# Patient Record
Sex: FEMALE | Race: WHITE
[De-identification: names, ages, dates, MRNs, and addresses within clinical notes are randomized per-mention and may not be internally consistent; named-entity substitution may affect disease eponyms.]

---

## 2019-03-12 ENCOUNTER — HOSPITAL ENCOUNTER (EMERGENCY)
Dept: HOSPITAL 53 - M ED | Age: 29
LOS: 1 days | Discharge: ANOTHER HOSPITAL | End: 2019-03-13
Payer: COMMERCIAL

## 2019-03-12 VITALS — BODY MASS INDEX: 41.62 KG/M2 | WEIGHT: 220.46 LBS | HEIGHT: 61 IN

## 2019-03-12 DIAGNOSIS — K56.609: Primary | ICD-10-CM

## 2019-03-12 LAB
ALBUMIN SERPL BCG-MCNC: 4.3 GM/DL (ref 3.2–5.2)
ALT SERPL W P-5'-P-CCNC: 107 U/L (ref 12–78)
BASOPHILS # BLD AUTO: 0 10^3/UL (ref 0–0.2)
BASOPHILS NFR BLD AUTO: 0.3 % (ref 0–1)
BILIRUB SERPL-MCNC: 0.2 MG/DL (ref 0.2–1)
BUN SERPL-MCNC: 13 MG/DL (ref 7–18)
CALCIUM SERPL-MCNC: 9.4 MG/DL (ref 8.5–10.1)
CHLORIDE SERPL-SCNC: 106 MEQ/L (ref 98–107)
CO2 SERPL-SCNC: 27 MEQ/L (ref 21–32)
CREAT SERPL-MCNC: 1 MG/DL (ref 0.55–1.3)
EOSINOPHIL # BLD AUTO: 0.3 10^3/UL (ref 0–0.5)
EOSINOPHIL NFR BLD AUTO: 1.8 % (ref 0–3)
GFR SERPL CREATININE-BSD FRML MDRD: > 60 ML/MIN/{1.73_M2} (ref 60–?)
GLUCOSE SERPL-MCNC: 100 MG/DL (ref 70–100)
HCT VFR BLD AUTO: 40 % (ref 36–47)
HGB BLD-MCNC: 13.3 G/DL (ref 12–15.5)
LIPASE SERPL-CCNC: 125 U/L (ref 73–393)
LYMPHOCYTES # BLD AUTO: 4.2 10^3/UL (ref 1.5–6.5)
LYMPHOCYTES NFR BLD AUTO: 28.4 % (ref 24–44)
MCH RBC QN AUTO: 29.4 PG (ref 27–33)
MCHC RBC AUTO-ENTMCNC: 33.3 G/DL (ref 32–36.5)
MCV RBC AUTO: 88.3 FL (ref 80–96)
MONOCYTES # BLD AUTO: 0.8 10^3/UL (ref 0–0.8)
MONOCYTES NFR BLD AUTO: 5.6 % (ref 0–5)
NEUTROPHILS # BLD AUTO: 9.4 10^3/UL (ref 1.8–7.7)
NEUTROPHILS NFR BLD AUTO: 63.5 % (ref 36–66)
PLATELET # BLD AUTO: 360 10^3/UL (ref 150–450)
POTASSIUM SERPL-SCNC: 4.5 MEQ/L (ref 3.5–5.1)
PROT SERPL-MCNC: 7.9 GM/DL (ref 6.4–8.2)
RBC # BLD AUTO: 4.53 10^6/UL (ref 4–5.4)
SODIUM SERPL-SCNC: 142 MEQ/L (ref 136–145)
WBC # BLD AUTO: 14.8 10^3/UL (ref 4–10)

## 2019-03-12 PROCEDURE — 87186 SC STD MICRODIL/AGAR DIL: CPT

## 2019-03-12 PROCEDURE — 74177 CT ABD & PELVIS W/CONTRAST: CPT

## 2019-03-12 PROCEDURE — 80053 COMPREHEN METABOLIC PANEL: CPT

## 2019-03-12 PROCEDURE — 85025 COMPLETE CBC W/AUTO DIFF WBC: CPT

## 2019-03-12 PROCEDURE — 99284 EMERGENCY DEPT VISIT MOD MDM: CPT

## 2019-03-12 PROCEDURE — 87088 URINE BACTERIA CULTURE: CPT

## 2019-03-12 PROCEDURE — 96374 THER/PROPH/DIAG INJ IV PUSH: CPT

## 2019-03-12 PROCEDURE — 81025 URINE PREGNANCY TEST: CPT

## 2019-03-12 PROCEDURE — 81001 URINALYSIS AUTO W/SCOPE: CPT

## 2019-03-12 PROCEDURE — 96361 HYDRATE IV INFUSION ADD-ON: CPT

## 2019-03-12 PROCEDURE — 83690 ASSAY OF LIPASE: CPT

## 2019-03-13 VITALS — SYSTOLIC BLOOD PRESSURE: 122 MMHG | DIASTOLIC BLOOD PRESSURE: 56 MMHG

## 2019-03-13 NOTE — REPVR
EXAM: 

 CT Abdomen and Pelvis With Contrast 



EXAM DATE/TIME: 

 3/12/2019 10:46 PM 



CLINICAL HISTORY: 

 28 years old, female; Pain; Abdominal pain; Generalized; Prior surgery; 

Surgery date: <1 month; Surgery type: G bypass; Additional info: No bm x 1 

week/vomiting/sp gastric bypass 



TECHNIQUE: 

 Axial computed tomography images of the abdomen and pelvis with intravenous 

contrast. 

 All CT scans at this facility use at least one of these dose optimization 

techniques: automated exposure control; mA and/or kV adjustment per patient 

size (includes targeted exams where dose is matched to clinical indication); or 

iterative reconstruction. 

 Coronal and sagittal reformatted images were created and reviewed. 



CONTRAST: 

 Contrast Material: 100 ml of iso; Contrast Route: ac 



COMPARISON: 

 No relevant prior studies available. 



FINDINGS: 

 Lower thorax: No acute findings. 



ABDOMEN: 

 Liver: Normal. No mass. 

 Gallbladder and bile ducts: Normal. No calcified stones. No ductal dilation. 

 Pancreas: Normal. No ductal dilation. 

 Spleen: Normal. No splenomegaly. 

 Adrenals: Normal. No mass. 

 Kidneys and ureters: Normal. No hydronephrosis. 

 Stomach and bowel:  Status post gastric bypass surgery. Dilated small bowel. 

Transition point in the right upper quadrant near the enteroenteric 

anastomosis. Distal small bowel loops are decompressed. Moderate stool in the 

throughout the colon. Focal thickening of the mid right colon. 

 Appendix: Status post appendectomy.



PELVIS: 

 Bladder: Unremarkable as visualized. 

 Reproductive:  Uterus is normal. 



ABDOMEN and PELVIS: 

 Intraperitoneal space:  Tiny free fluid in the pelvis. No free air.

 Bones/joints: No acute fracture. No dislocation. 

 Soft tissues:  There is dependent subcutaneous edema. 

 Vasculature: Normal. No abdominal aortic aneurysm. 

 Lymph nodes: Normal. No enlarged lymph nodes. Multiple small mesenteric nodes.



IMPRESSION: 

Status post gastric bypass surgery.

Findings consistent with high-grade partial versus complete small bowel 

obstruction. Transition point near the enteroenteric anastomosis.

Focal thickening of the mid right colon. Possible peristaltic contraction 

versus malignancy versus colitis.

No evidence of bowel perforation.

Additional findings as described.



Electronically signed by: Andrés Vu On 03/13/2019  01:14:33 AM